# Patient Record
Sex: MALE | Race: OTHER | HISPANIC OR LATINO | URBAN - METROPOLITAN AREA
[De-identification: names, ages, dates, MRNs, and addresses within clinical notes are randomized per-mention and may not be internally consistent; named-entity substitution may affect disease eponyms.]

---

## 2022-08-21 ENCOUNTER — EMERGENCY (EMERGENCY)
Facility: HOSPITAL | Age: 42
LOS: 1 days | Discharge: HOME | End: 2022-08-21
Attending: EMERGENCY MEDICINE
Payer: SELF-PAY

## 2022-08-21 VITALS
WEIGHT: 154.32 LBS | HEIGHT: 60.63 IN | OXYGEN SATURATION: 98 % | DIASTOLIC BLOOD PRESSURE: 80 MMHG | TEMPERATURE: 98 F | SYSTOLIC BLOOD PRESSURE: 122 MMHG | HEART RATE: 108 BPM | RESPIRATION RATE: 18 BRPM

## 2022-08-21 VITALS
DIASTOLIC BLOOD PRESSURE: 73 MMHG | RESPIRATION RATE: 18 BRPM | HEART RATE: 83 BPM | SYSTOLIC BLOOD PRESSURE: 107 MMHG | TEMPERATURE: 99 F | OXYGEN SATURATION: 100 %

## 2022-08-21 LAB
ALBUMIN SERPL ELPH-MCNC: 3.3 G/DL — LOW (ref 3.5–5)
ALP SERPL-CCNC: 55 U/L — SIGNIFICANT CHANGE UP (ref 40–120)
ALT FLD-CCNC: 42 U/L DA — SIGNIFICANT CHANGE UP (ref 10–60)
ANION GAP SERPL CALC-SCNC: 10 MMOL/L — SIGNIFICANT CHANGE UP (ref 5–17)
AST SERPL-CCNC: 68 U/L — HIGH (ref 10–40)
BASOPHILS # BLD AUTO: 0.04 K/UL — SIGNIFICANT CHANGE UP (ref 0–0.2)
BASOPHILS NFR BLD AUTO: 0.4 % — SIGNIFICANT CHANGE UP (ref 0–2)
BILIRUB SERPL-MCNC: 0.2 MG/DL — SIGNIFICANT CHANGE UP (ref 0.2–1.2)
BUN SERPL-MCNC: 8 MG/DL — SIGNIFICANT CHANGE UP (ref 7–18)
CALCIUM SERPL-MCNC: 9 MG/DL — SIGNIFICANT CHANGE UP (ref 8.4–10.5)
CHLORIDE SERPL-SCNC: 107 MMOL/L — SIGNIFICANT CHANGE UP (ref 96–108)
CO2 SERPL-SCNC: 23 MMOL/L — SIGNIFICANT CHANGE UP (ref 22–31)
CREAT SERPL-MCNC: 1.04 MG/DL — SIGNIFICANT CHANGE UP (ref 0.5–1.3)
EGFR: 93 ML/MIN/1.73M2 — SIGNIFICANT CHANGE UP
EOSINOPHIL # BLD AUTO: 0.13 K/UL — SIGNIFICANT CHANGE UP (ref 0–0.5)
EOSINOPHIL NFR BLD AUTO: 1.5 % — SIGNIFICANT CHANGE UP (ref 0–6)
ETHANOL SERPL-MCNC: 182 MG/DL — HIGH (ref 0–10)
GLUCOSE SERPL-MCNC: 88 MG/DL — SIGNIFICANT CHANGE UP (ref 70–99)
HCT VFR BLD CALC: 44.3 % — SIGNIFICANT CHANGE UP (ref 39–50)
HGB BLD-MCNC: 15.2 G/DL — SIGNIFICANT CHANGE UP (ref 13–17)
IMM GRANULOCYTES NFR BLD AUTO: 0.3 % — SIGNIFICANT CHANGE UP (ref 0–1.5)
LYMPHOCYTES # BLD AUTO: 1.8 K/UL — SIGNIFICANT CHANGE UP (ref 1–3.3)
LYMPHOCYTES # BLD AUTO: 20.2 % — SIGNIFICANT CHANGE UP (ref 13–44)
MCHC RBC-ENTMCNC: 34.3 GM/DL — SIGNIFICANT CHANGE UP (ref 32–36)
MCHC RBC-ENTMCNC: 34.9 PG — HIGH (ref 27–34)
MCV RBC AUTO: 101.6 FL — HIGH (ref 80–100)
MONOCYTES # BLD AUTO: 0.91 K/UL — HIGH (ref 0–0.9)
MONOCYTES NFR BLD AUTO: 10.2 % — SIGNIFICANT CHANGE UP (ref 2–14)
NEUTROPHILS # BLD AUTO: 6.02 K/UL — SIGNIFICANT CHANGE UP (ref 1.8–7.4)
NEUTROPHILS NFR BLD AUTO: 67.4 % — SIGNIFICANT CHANGE UP (ref 43–77)
NRBC # BLD: 0 /100 WBCS — SIGNIFICANT CHANGE UP (ref 0–0)
PLATELET # BLD AUTO: 255 K/UL — SIGNIFICANT CHANGE UP (ref 150–400)
POTASSIUM SERPL-MCNC: 3.8 MMOL/L — SIGNIFICANT CHANGE UP (ref 3.5–5.3)
POTASSIUM SERPL-SCNC: 3.8 MMOL/L — SIGNIFICANT CHANGE UP (ref 3.5–5.3)
PROT SERPL-MCNC: 7.2 G/DL — SIGNIFICANT CHANGE UP (ref 6–8.3)
RBC # BLD: 4.36 M/UL — SIGNIFICANT CHANGE UP (ref 4.2–5.8)
RBC # FLD: 13.5 % — SIGNIFICANT CHANGE UP (ref 10.3–14.5)
SODIUM SERPL-SCNC: 140 MMOL/L — SIGNIFICANT CHANGE UP (ref 135–145)
WBC # BLD: 8.93 K/UL — SIGNIFICANT CHANGE UP (ref 3.8–10.5)
WBC # FLD AUTO: 8.93 K/UL — SIGNIFICANT CHANGE UP (ref 3.8–10.5)

## 2022-08-21 PROCEDURE — 96374 THER/PROPH/DIAG INJ IV PUSH: CPT

## 2022-08-21 PROCEDURE — 85025 COMPLETE CBC W/AUTO DIFF WBC: CPT

## 2022-08-21 PROCEDURE — 99285 EMERGENCY DEPT VISIT HI MDM: CPT

## 2022-08-21 PROCEDURE — 70486 CT MAXILLOFACIAL W/O DYE: CPT | Mod: 26,MG

## 2022-08-21 PROCEDURE — 70450 CT HEAD/BRAIN W/O DYE: CPT | Mod: MG

## 2022-08-21 PROCEDURE — 70450 CT HEAD/BRAIN W/O DYE: CPT | Mod: 26,MG

## 2022-08-21 PROCEDURE — 80307 DRUG TEST PRSMV CHEM ANLYZR: CPT

## 2022-08-21 PROCEDURE — G1004: CPT

## 2022-08-21 PROCEDURE — 70486 CT MAXILLOFACIAL W/O DYE: CPT | Mod: MG

## 2022-08-21 PROCEDURE — 99285 EMERGENCY DEPT VISIT HI MDM: CPT | Mod: 25

## 2022-08-21 PROCEDURE — 80053 COMPREHEN METABOLIC PANEL: CPT

## 2022-08-21 PROCEDURE — 96372 THER/PROPH/DIAG INJ SC/IM: CPT | Mod: XU

## 2022-08-21 PROCEDURE — 36415 COLL VENOUS BLD VENIPUNCTURE: CPT

## 2022-08-21 PROCEDURE — 99053 MED SERV 10PM-8AM 24 HR FAC: CPT

## 2022-08-21 RX ORDER — HALOPERIDOL DECANOATE 100 MG/ML
5 INJECTION INTRAMUSCULAR ONCE
Refills: 0 | Status: COMPLETED | OUTPATIENT
Start: 2022-08-21 | End: 2022-08-21

## 2022-08-21 RX ADMIN — HALOPERIDOL DECANOATE 5 MILLIGRAM(S): 100 INJECTION INTRAMUSCULAR at 00:56

## 2022-08-21 RX ADMIN — Medication 2 MILLIGRAM(S): at 01:24
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
## 2022-08-21 NOTE — ED PROVIDER NOTE - PATIENT PORTAL LINK FT
1 or 2 You can access the FollowMyHealth Patient Portal offered by [facility] by registering at the following website: [email or web address]. By joining [facility]’s FollowMyHealth portal, you will also be able to view your health information using other applications (apps) compatible with our system.

## 2022-08-21 NOTE — ED ADULT NURSE REASSESSMENT NOTE - NS ED NURSE REASSESS COMMENT FT1
Patient Alert and responsive, arousable to verbal stimuli. Gait unstable. Pending dispo. Denies any c/o pain/discomfort. Report endorsed to incoming RN.
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
## 2022-08-21 NOTE — ED ADULT NURSE NOTE - NSIMPLEMENTINTERV_GEN_ALL_ED
Implemented All Fall Risk Interventions:  [place] to call system. Call bell, personal items and telephone within reach. Instruct patient to call for assistance. Room bathroom lighting operational. Non-slip footwear when patient is off stretcher. Physically safe environment: no spills, clutter or unnecessary equipment. Stretcher in lowest position, wheels locked, appropriate side rails in place. Provide visual cue, wrist band, yellow gown, etc. Monitor gait and stability. Monitor for mental status changes and reorient to person, place, and time. Review medications for side effects contributing to fall risk. Reinforce activity limits and safety measures with patient and family.

## 2022-08-21 NOTE — ED PROVIDER NOTE - NSFOLLOWUPINSTRUCTIONS_ED_ALL_ED_FT
Fractura nasal    Nasal Fracture      Get fractura es la ruptura de un hueso. Get fractura nasal es gte nariz quebrada. Las fracturas menores no requieren tratamiento. Habitualmente se curan por sí solas en aproximadamente un mes. Las fracturas graves pueden necesitar tratamiento. En algunos casos se requiere cirugía.      ¿Cuáles son las causas?    Generalmente, la causa de esta afección es un golpe directo a la nariz (traumatismo cerrado). Index se produce a menudo por:  •Practicar un deporte de contacto.       •Tener un accidente automovilístico.      •Caídas.       •Puñetazos.        ¿Cuáles son los signos o los síntomas?    •Dolor.       •Hinchazón de la nariz.       •Hemorragia nasal.       •Moretones alrededor de la nariz o alrededor de los ojos (ojos amoratados).      •Nariz torcida.        ¿Cómo se trata?    El tratamiento depende de la gravedad de la lesión.  •Las fracturas menores no suelen requerir tratamiento.    •En el elaine de las fracturas más graves que produjeron un desplazamiento de los huesos, el tratamiento puede incluir too de estos:  •Volver a colocar los huesos en la posición correcta sin realizar get cirugía. Es posible que el médico pueda realizar christopher procedimiento en el consultorio después de administrarle un medicamento para adormecer la marcelo de la nariz (anestesia local).      •Get cirugía. Si es necesario, se llevará a cabo después de que la hinchazón haya desaparecido.          Siga estas indicaciones en hernández casa:    Actividad     •Retome joel actividades habituales winston se lo haya indicado el médico. Pregúntele al médico qué actividades son seguras para usted.      • No practique deportes de contacto edie 3 o 4 semanas, o winston se lo haya indicado el médico.        Indicaciones generales                 •Aplique hielo sobre la marcelo lesionada, si se lo indican:  •Ponga el hielo en get bolsa plástica.      •Coloque get toalla entre la piel y la bolsa.      •Coloque el hielo edie 20 minutos, 2 a 3 veces por día.        •[place] los medicamentos de venta alta y los recetados solamente winston se lo haya indicado el médico.      •Si le sangra la nariz, siéntese erguido mientras se aprieta la nariz edie 10 minutos.      •Intente no sonarse la nariz.      •Concurra a todas las visitas de seguimiento winston se lo haya indicado el médico. Index es importante.        Comuníquese con un médico si:    •Aumenta el dolor o se vuelve muy intenso.      •Sigue teniendo hemorragias nasales.      •La forma de la nariz no vuelve a la normalidad después de 5 días.      •Le sale pus de la nariz.        Solicite ayuda inmediatamente si:    •La nariz le sangra edie más de 20 minutos.      •Le sale un líquido transparente de la nariz.      •Tiene get hinchazón en el interior de la nariz que no mejora.      •Tiene problemas para  los ojos.      •No penny de vomitar.        Resumen    •Get fractura nasal es get nariz quebrada.      •Los síntomas incluyen dolor, hinchazón y moretones.      •Las fracturas menores no suelen requerir tratamiento. Las fracturas más graves pueden requerir cirugía u otros tratamientos.      •Si le sangra la nariz, siéntese erguido mientras se aprieta la nariz edie 10 minutos.      Esta información no tiene [place]ston fin reemplazar el consejo del médico. Asegúrese de hacerle al médico cualquier pregunta que tenga.      Document Revised: 06/27/2019 Document Reviewed: 06/27/2019    Elsevier Patient Education © 2022 [employer]       Intoxicación alcohólica    Alcohol Intoxication      La intoxicación alcohólica ocurre cuando get persona ya no piensa con claridad ni se desempeña shasha (experimenta un deterioro) después de consumir bebidas alcohólicas. La intoxicación alcohólica puede ocurrir tan solo con get copa. El definición legal de la intoxicación alcohólica depende de la cantidad de alcohol en la carine (concentración de alcohol en la carine, ROMA). Get ROMA de 80 a 100 mg/dl o mayor se considera legalmente [place] get intoxicación alcohólica. El nivel de deterioro depende de lo siguiente:  •La cantidad de alcohol que la persona bebió.      •La edad, el sexo y el peso de la persona.      •La frecuencia con la que la persona tatianna.      •Si la persona tiene otras enfermedades, tales win[place] diabetes, convulsiones o get afección cardíaca.      La intoxicación alcohólica puede variar de leve a grave. La afección puede ser peligrosa, especialmente si la persona:  •También usa ciertas drogas ilegales y medicamentos recetados.    •Tatianna get gran cantidad de alcohol en un periodo corto de tiempo (consume alcohol compulsivamente).  •En las mujeres, el consumo de alcohol compulsivo significa beber cuatro o más medidas de alcohol a la vez.      •En los hombres, el consumo de alcohol compulsivo significa beber brad o más medidas de alcohol a la vez.        Si usted o alguien a hernández alrededor parece estar embriagado, diga algo y actúe.      ¿Cuáles son las causas?    La causa de esta afección es el consumo de alcohol.      ¿Qué incrementa el riesgo?    Los siguientes factores pueden hacer que sea más propenso a contraer esta afección:  •Presión de los pares en los adultos jóvenes.      •Dificultad para manejar el estrés.      •Antecedentes de consumo excesivo de drogas o alcohol.      •Combinación de alcohol con drogas.      •Antecedentes familiares de consumo excesivo de alcohol o drogas.      •Peso corporal bajo.      •Consumo de alcohol compulsivo.        ¿Cuáles son los signos o síntomas?    Los síntomas de la intoxicación alcohólica pueden variar de get persona a otra. Los síntomas pueden ser leves, moderados o graves.    Los síntomas de get intoxicación alcohólica leve pueden incluir los siguientes:  •Sensación de relajación o somnolencia.      •Tener get leve dificultad con la coordinación, el habla, la memoria o la atención.      Los síntomas de get intoxicación alcohólica moderada pueden incluir los siguientes:  •Emociones extremas, winston enojo o tristeza.      •Tener dificultad con la coordinación, el habla, la memoria o la atención.      Los síntomas de get intoxicación alcohólica grave pueden incluir los siguientes:  •Tener get seria dificultad con la coordinación, el habla, la memoria o la atención.      •Desmayo.      •Vómitos.      •Confusión.      •Respiración lenta.      •Coma.      La intoxicación alcohólica puede cambiar rápidamente de leve a grave. Puede causar coma o la muerte, especialmente en las personas que no están expuestas al alcohol con frecuencia.      ¿Cómo se diagnostica?    El médico le preguntará la cantidad y el tipo de bebida alcohólica que bebió. La intoxicación también puede diagnosticarse en función de:  •Los síntomas y los antecedentes médicos.      •Un examen físico.      •Un análisis de carine que mide la ROMA.      •El olor a alcohol en la respiración.        ¿Cómo se trata?    El tratamiento para la intoxicación por alcohol puede incluir:  •Ser controlado en un departamento de emergencias, hospital o centro de tratamiento hasta que la ROMA disminuya y sea seguro para usted regresar a hernández casa.      •Líquidos intravenosos (i.v.) para prevenir o tratar la pérdida de líquido en el cuerpo (deshidratación).      •Medicamentos para tratar las náuseas o los vómitos, o para eliminar el alcohol del cuerpo.      •Asesoramiento (intervención breve) sobre los peligros de consumir alcohol.      •Tratamiento para el trastorno por el consumo de sustancias.      •Oxigenoterapia o get máquina para respirar (respirador).      La exposición a fatemeh plazo (crónica) al alcohol puede tener efectos a fatemeh plazo en el cerebro, el corazón y el sistema digestivo. Estos efectos pueden ser graves y pueden requerir tratamiento.      Siga estas indicaciones en hernández casa:       Comida y bebida    • No marcelle alcohol si:  •Hernández médico le indica no hacerlo.      •Está embarazada, puede estar embarazada o está tratando de quedar embarazada.      •No tiene la edad legal para beber (mayor de 21 años de edad en los [place]      •Está tomando medicamentos que no se deben patrick con alcohol.      •Tiene get afección médica y el alcohol la empeora.      •Tiene que conducir o realizar actividades que requieren que esté alerta.      •Tiene un trastorno por abuso de sustancias.      •Pregúntele al médico si el alcohol es seguro para usted. Si el médico le permite beber alcohol, limite la cantidad que mat. Puede beber:  •De 0 a 1 medida por día para las mujeres.     • De 0 a 2 medidas por día para los hombres.   •Esté atento a la cantidad de alcohol que hay en las bebidas que mat. En [place], get medida equivale a get botella de cerveza de 12 oz (355 ml), un vaso de vino de 5 oz (148 ml) o un vaso de get bebida alcohólica de ruben graduación de 1½ oz (44 ml).          •Evite beber alcohol con el estómago vacío.      •Manténgase hidratado. Marcelle suficiente líquido para mantener la orina de color amarillo pálido. Evite la cafeína, ya que puede deshidratarlo.      •Evite consumir más de get bebida alcohólica por hora.      •Cuando marcelle más que get medida de alcohol, marcelle agua o get bebida sin alcohol entre las bebidas alcohólicas.      Indicaciones generales     •[place] los medicamentos de venta alta y los recetados solamente winston se lo haya indicado el médico.      • No conduzca después de beber cualquier cantidad de alcohol. Organice un conductor designado u otra forma de volver a casa.      •[place]ídale a alguna persona responsable que se quede con usted mientras está embriagado. No debería quedarse solo.      •Concurra a todas las visitas de seguimiento [place] se lo haya indicado el médico. Index es importante.        Comuníquese con un médico si:    •No mejora luego de algunos días.      •Tiene problemas en el trabajo, la escuela o el hogar debido al consumo de alcohol.        Solicite ayuda de inmediato si:  •Tiene alguno de los siguientes síntomas:  •Grave dificultad con la coordinación, el habla, la memoria o la atención.      •Dificultad para mantenerse despierto.      •Confusión grave.      •Get convulsión.      •Desvanecimiento.      •Desmayos.      •Vómitos con carine de color gonzalez brillante o get sustancia parecida a la borra del café.      •Tiene carine en la materia fecal (heces). La carine puede hacer que las heces tengan color gonzalez brillante, color regina o aspecto alquitranado. También pueden tener mal olor.      •Temblores al tratar de abandonar el hábito de beber.      •Pensamientos acerca de lastimarse a sí mismo o a otras personas.        Si alguna vez siente que puede lastimarse o lastimar a otras personas, o tiene pensamientos de poner fin a hernández elaine, busque ayuda de inmediato. Puede dirigirse al servicio de emergencias más cercano o comunicarse con:   • El servicio de emergencias de hernández localidad (911 en [place]       • Get línea de asistencia al suicida y atención en crisis, [place] National Suicide Prevention Lifeline (Línea Nacional de Prevención del Suicidio), al 5-584-581-4539. Está disponible las 24 horas del día.         Resumen    •La intoxicación alcohólica ocurre cuando get persona ya no piensa con claridad ni se desempeña shasha después de consumir bebidas alcohólicas.      •Si el médico le dice que no corre riesgos al beber alcohol, limite hernández consumo a no más de 1 medida al día si es tomas (no marcelle si está embarazada) y 2 medidas si es hombre. Get medida equivale a 12 onzas de cerveza, 5 onzas de vino o 1½ onzas de bebidas alcohólicas de ruben graduación.      •Comuníquese con el médico si el consumo de alcohol le ha causado problemas en el trabajo, en la escuela o en hernández casa.      •Busque ayuda de inmediato si tiene pensamientos acerca de lastimarse a usted mismo o a otras personas.      Esta información no tiene [place] fin reemplazar el consejo del médico. Asegúrese de hacerle al médico cualquier pregunta que tenga.      Document Revised: 05/15/2019 Document Reviewed: 05/15/2019    Elsevier Patient Education © 2022 Elsevier Inc.
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
## 2022-08-21 NOTE — ED PROVIDER NOTE - CARE PLAN
1 Principal Discharge DX:	Alcohol intoxication, uncomplicated  Secondary Diagnosis:	Nasal bone fracture

## 2024-10-31 NOTE — ED PROVIDER NOTE - CONTEXT
[TextEntry] : IKeena, am scribing for and the presence of Dr. Maria Jones the following sections HISTORY OF PRESENT ILLNESS, PAST MEDICAL/FAMILY/SOCIAL HISTORY; REVIEW OF SYSTEMS; PHYSICAL EXAM; DISPOSITION.
[TextEntry] : IKeena, am scribing for and the presence of Dr. Maria Jones the following sections HISTORY OF PRESENT ILLNESS, PAST MEDICAL/FAMILY/SOCIAL HISTORY; REVIEW OF SYSTEMS; PHYSICAL EXAM; DISPOSITION.
alcohol related